# Patient Record
Sex: FEMALE | Race: BLACK OR AFRICAN AMERICAN | NOT HISPANIC OR LATINO | URBAN - METROPOLITAN AREA
[De-identification: names, ages, dates, MRNs, and addresses within clinical notes are randomized per-mention and may not be internally consistent; named-entity substitution may affect disease eponyms.]

---

## 2020-07-25 ENCOUNTER — TELEPHONE ENCOUNTER (OUTPATIENT)
Dept: URBAN - METROPOLITAN AREA CLINIC 13 | Facility: CLINIC | Age: 70
End: 2020-07-25

## 2020-07-25 RX ORDER — POLYETHYLENE GLYCOL 3350, SODIUM CHLORIDE, SODIUM BICARBONATE AND POTASSIUM CHLORIDE WITH LEMON FLAVOR 420; 11.2; 5.72; 1.48 G/4L; G/4L; G/4L; G/4L
TAKE 1/2 GALLON AT 5:00 PM DAY BEFORE PROCEDURE, TAKE SECOND 1/2 OF GALLON 6 HRS PRIOR TO PROCEDURE POWDER, FOR SOLUTION ORAL
Qty: 1 | Refills: 0 | OUTPATIENT
Start: 2019-07-03 | End: 2019-09-25

## 2020-07-25 RX ORDER — POLYETHYLENE GLYCOL 3350, SODIUM CHLORIDE, SODIUM BICARBONATE AND POTASSIUM CHLORIDE WITH LEMON FLAVOR 420; 11.2; 5.72; 1.48 G/4L; G/4L; G/4L; G/4L
USE AS DIRECTED POWDER, FOR SOLUTION ORAL
Qty: 1 | Refills: 0 | OUTPATIENT
Start: 2014-08-12 | End: 2014-09-23

## 2020-07-25 RX ORDER — EZETIMIBE 10 MG/1
TAKE 1 TABLET DAILY PRN TABLET ORAL
Refills: 0 | OUTPATIENT
Start: 2014-08-12 | End: 2019-09-25

## 2020-07-25 RX ORDER — IPRATROPIUM BROMIDE AND ALBUTEROL 20; 100 UG/1; UG/1
INHALE 1 PUFF 4 TIMES DAILY (MAXIMUM OF 6 PUFFS IN 24 HOURS) SPRAY, METERED RESPIRATORY (INHALATION)
Refills: 0 | OUTPATIENT
Start: 2014-08-12 | End: 2014-09-23

## 2020-07-25 RX ORDER — FUROSEMIDE 40 MG/1
TAKE 1/2 TABLET DAILY TABLET ORAL
Refills: 0 | OUTPATIENT
Start: 2014-08-12 | End: 2014-09-23

## 2020-07-26 ENCOUNTER — TELEPHONE ENCOUNTER (OUTPATIENT)
Dept: URBAN - METROPOLITAN AREA CLINIC 13 | Facility: CLINIC | Age: 70
End: 2020-07-26

## 2020-07-26 RX ORDER — POTASSIUM CHLORIDE 750 MG/1
TABLET, EXTENDED RELEASE ORAL
Qty: 180 | Refills: 0 | Status: ACTIVE | COMMUNITY
Start: 2018-10-29

## 2020-07-26 RX ORDER — SODIUM CHLORIDE 50 MG/ML
SOLUTION/ DROPS OPHTHALMIC
Qty: 45 | Refills: 0 | Status: ACTIVE | COMMUNITY
Start: 2019-06-03

## 2020-07-26 RX ORDER — LOSARTAN POTASSIUM AND HYDROCHLOROTHIAZIDE 50; 12.5 MG/1; MG/1
TABLET, FILM COATED ORAL
Qty: 90 | Refills: 0 | Status: ACTIVE | COMMUNITY
Start: 2018-10-04

## 2020-07-26 RX ORDER — DORZOLAMIDE HCL/PF 2 %
INSTILL 1 DROP INTO BOTH EYES DAILY DROPS OPHTHALMIC (EYE)
Refills: 0 | Status: ACTIVE | COMMUNITY

## 2020-07-26 RX ORDER — INSULIN GLARGINE 100 [IU]/ML
INJECTION, SOLUTION SUBCUTANEOUS
Qty: 15 | Refills: 0 | Status: ACTIVE | COMMUNITY
Start: 2018-11-26

## 2020-07-26 RX ORDER — LATANOPROST 0.005 %
INSTILL 1 DROP IN BOTH EYES AT BEDTIME DROPS OPHTHALMIC (EYE)
Refills: 0 | Status: ACTIVE | COMMUNITY
Start: 2014-08-12

## 2020-07-26 RX ORDER — DORZOLAMIDE HCL/PF 2 %
DROPS OPHTHALMIC (EYE)
Qty: 20 | Refills: 0 | Status: ACTIVE | COMMUNITY
Start: 2018-10-30

## 2020-07-26 RX ORDER — PANTOPRAZOLE SODIUM 40 MG/1
TABLET, DELAYED RELEASE ORAL
Qty: 90 | Refills: 0 | Status: ACTIVE | COMMUNITY
Start: 2018-11-15

## 2020-07-26 RX ORDER — SITAGLIPTIN AND METFORMIN HYDROCHLORIDE 100; 1000 MG/1; MG/1
TAKE 1 TABLET DAILY TABLET, FILM COATED, EXTENDED RELEASE ORAL
Refills: 0 | Status: ACTIVE | COMMUNITY
Start: 2014-08-12

## 2020-07-26 RX ORDER — PSYLLIUM HUSK 0.4 G
TAKE 2 TABLET DAILY CAPSULE ORAL
Refills: 0 | Status: ACTIVE | COMMUNITY

## 2020-07-26 RX ORDER — FLUTICASONE PROPIONATE AND SALMETEROL 50; 100 UG/1; UG/1
INHALE 1 PUFF TWICE DAILY POWDER RESPIRATORY (INHALATION)
Refills: 0 | Status: ACTIVE | COMMUNITY
Start: 2014-08-12

## 2020-07-26 RX ORDER — METOPROLOL TARTRATE 25 MG/1
TAKE 1 TABLET DAILY TABLET, FILM COATED ORAL
Refills: 0 | Status: ACTIVE | COMMUNITY
Start: 2014-08-12

## 2020-07-26 RX ORDER — KRILL/OM-3/DHA/EPA/PHOSPHO/AST 1000-230MG
TAKE 1 CAPSULE DAILY CAPSULE ORAL
Refills: 0 | Status: ACTIVE | COMMUNITY

## 2020-07-26 RX ORDER — PANTOPRAZOLE SODIUM 20 MG
TAKE 1 TABLET DAILY TABLET, DELAYED RELEASE (ENTERIC COATED) ORAL
Qty: 5 | Refills: 2 | Status: ACTIVE | COMMUNITY
Start: 2019-07-03

## 2020-07-26 RX ORDER — INSULIN GLARGINE 100 [IU]/ML
INJECT 24 UNIT DAILY INJECTION, SOLUTION SUBCUTANEOUS
Refills: 0 | Status: ACTIVE | COMMUNITY
Start: 2014-08-12

## 2020-07-26 RX ORDER — DICLOFENAC SODIUM 10 MG/G
GEL TOPICAL
Qty: 400 | Refills: 0 | Status: ACTIVE | COMMUNITY
Start: 2019-08-19

## 2020-07-26 RX ORDER — BRIMONIDINE TARTRATE 2 MG/ML
INSTILL 1 DROP IN BOTH EYES EVERY 12 HOURS DAILY SOLUTION/ DROPS OPHTHALMIC
Refills: 0 | Status: ACTIVE | COMMUNITY
Start: 2014-08-12

## 2020-07-26 RX ORDER — BLOOD SUGAR DIAGNOSTIC
STRIP MISCELLANEOUS
Qty: 300 | Refills: 0 | Status: ACTIVE | COMMUNITY
Start: 2018-10-16

## 2020-07-26 RX ORDER — BLOOD-GLUCOSE METER
EACH MISCELLANEOUS
Qty: 1 | Refills: 0 | Status: ACTIVE | COMMUNITY
Start: 2018-10-16

## 2020-07-26 RX ORDER — BLOOD GLUCOSE CONTROL HIGH,LOW
EACH MISCELLANEOUS
Qty: 1 | Refills: 0 | Status: ACTIVE | COMMUNITY
Start: 2018-11-09

## 2020-07-26 RX ORDER — POTASSIUM CHLORIDE 750 MG/1
TAKE 2 TABLET DAILY TABLET, EXTENDED RELEASE ORAL
Refills: 0 | Status: ACTIVE | COMMUNITY
Start: 2014-08-12

## 2020-07-26 RX ORDER — EZETIMIBE AND SIMVASTATIN 10; 40 MG/1; MG/1
TAKE 1 TABLET DAILY PRN TABLET ORAL
Refills: 0 | Status: ACTIVE | COMMUNITY
Start: 2014-08-12

## 2022-03-03 ENCOUNTER — WEB ENCOUNTER (OUTPATIENT)
Dept: URBAN - METROPOLITAN AREA CLINIC 113 | Facility: CLINIC | Age: 72
End: 2022-03-03

## 2022-03-03 ENCOUNTER — OFFICE VISIT (OUTPATIENT)
Dept: URBAN - METROPOLITAN AREA CLINIC 113 | Facility: CLINIC | Age: 72
End: 2022-03-03
Payer: MEDICARE

## 2022-03-03 VITALS
BODY MASS INDEX: 31.36 KG/M2 | SYSTOLIC BLOOD PRESSURE: 147 MMHG | HEIGHT: 63 IN | DIASTOLIC BLOOD PRESSURE: 82 MMHG | TEMPERATURE: 97.7 F | WEIGHT: 177 LBS | HEART RATE: 67 BPM | RESPIRATION RATE: 20 BRPM

## 2022-03-03 DIAGNOSIS — K21.9 GASTROESOPHAGEAL REFLUX DISEASE, UNSPECIFIED WHETHER ESOPHAGITIS PRESENT: ICD-10-CM

## 2022-03-03 DIAGNOSIS — R74.8 ELEVATED LIVER ENZYMES: ICD-10-CM

## 2022-03-03 PROCEDURE — 99204 OFFICE O/P NEW MOD 45 MIN: CPT | Performed by: NURSE PRACTITIONER

## 2022-03-03 RX ORDER — FLUTICASONE PROPIONATE AND SALMETEROL 50; 100 UG/1; UG/1
INHALE 1 PUFF TWICE DAILY POWDER RESPIRATORY (INHALATION)
Refills: 0 | Status: ACTIVE | COMMUNITY
Start: 2014-08-12

## 2022-03-03 RX ORDER — KRILL/OM-3/DHA/EPA/PHOSPHO/AST 1000-230MG
TAKE 1 CAPSULE DAILY CAPSULE ORAL
Refills: 0 | Status: ON HOLD | COMMUNITY

## 2022-03-03 RX ORDER — LOSARTAN POTASSIUM AND HYDROCHLOROTHIAZIDE 50; 12.5 MG/1; MG/1
1 TABLET TABLET, FILM COATED ORAL ONCE A DAY
Refills: 0 | Status: ACTIVE | COMMUNITY
Start: 2018-10-04

## 2022-03-03 RX ORDER — SODIUM CHLORIDE 50 MG/ML
SOLUTION/ DROPS OPHTHALMIC
Qty: 45 | Refills: 0 | Status: ACTIVE | COMMUNITY
Start: 2019-06-03

## 2022-03-03 RX ORDER — DICLOFENAC SODIUM 10 MG/G
GEL TOPICAL
Qty: 400 | Refills: 0 | Status: ON HOLD | COMMUNITY
Start: 2019-08-19

## 2022-03-03 RX ORDER — ASPIRIN 81 MG/1
1 TABLET TABLET, COATED ORAL ONCE A DAY
Status: ACTIVE | COMMUNITY

## 2022-03-03 RX ORDER — DORZOLAMIDE HCL/PF 2 %
INSTILL 1 DROP INTO BOTH EYES DAILY DROPS OPHTHALMIC (EYE)
Refills: 0 | Status: ACTIVE | COMMUNITY

## 2022-03-03 RX ORDER — PANTOPRAZOLE SODIUM 40 MG/1
TABLET, DELAYED RELEASE ORAL
Qty: 90 | Refills: 0 | Status: ACTIVE | COMMUNITY
Start: 2018-11-15

## 2022-03-03 RX ORDER — POTASSIUM CHLORIDE 750 MG/1
TABLET, EXTENDED RELEASE ORAL
Qty: 180 | Refills: 0 | Status: ON HOLD | COMMUNITY
Start: 2018-10-29

## 2022-03-03 RX ORDER — EZETIMIBE AND SIMVASTATIN 10; 40 MG/1; MG/1
TAKE 1 TABLET DAILY PRN TABLET ORAL
Refills: 0 | Status: ON HOLD | COMMUNITY
Start: 2014-08-12

## 2022-03-03 RX ORDER — METOPROLOL TARTRATE 25 MG/1
TAKE 1 TABLET DAILY TABLET, FILM COATED ORAL
Refills: 0 | Status: ACTIVE | COMMUNITY
Start: 2014-08-12

## 2022-03-03 RX ORDER — PANTOPRAZOLE SODIUM 20 MG
TAKE 1 TABLET DAILY TABLET, DELAYED RELEASE (ENTERIC COATED) ORAL
Qty: 5 | Refills: 2 | Status: ON HOLD | COMMUNITY
Start: 2019-07-03

## 2022-03-03 RX ORDER — COD LIVER OIL
AS DIRECTED CAPSULE ORAL
Status: ACTIVE | COMMUNITY

## 2022-03-03 RX ORDER — METHYLCELLULOSE 500 MG/1
2 TABLETS WITH A FULL GLASS OF WATER AS NEEDED TABLET ORAL
Status: ACTIVE | COMMUNITY

## 2022-03-03 RX ORDER — LATANOPROST 0.005 %
INSTILL 1 DROP IN BOTH EYES AT BEDTIME DROPS OPHTHALMIC (EYE)
Refills: 0 | Status: ACTIVE | COMMUNITY
Start: 2014-08-12

## 2022-03-03 RX ORDER — INSULIN GLARGINE 100 [IU]/ML
AS DIRECTED INJECTION, SOLUTION SUBCUTANEOUS AT BEDTIME
Refills: 0 | Status: ON HOLD | COMMUNITY
Start: 2014-08-12

## 2022-03-03 RX ORDER — INSULIN GLARGINE 100 [IU]/ML
INJECT 12 UNITS INJECTION, SOLUTION SUBCUTANEOUS ONCE DAILY
Refills: 0 | Status: ACTIVE | COMMUNITY
Start: 2018-11-26

## 2022-03-03 RX ORDER — GLUCOSA SU 2KCL/CHONDROITIN SU 500-400 MG
AS DIRECTED CAPSULE ORAL
Status: ACTIVE | COMMUNITY

## 2022-03-03 RX ORDER — SITAGLIPTIN AND METFORMIN HYDROCHLORIDE 100; 1000 MG/1; MG/1
TAKE 1 TABLET DAILY TABLET, FILM COATED, EXTENDED RELEASE ORAL
Refills: 0 | Status: ACTIVE | COMMUNITY
Start: 2014-08-12

## 2022-03-03 RX ORDER — BRIMONIDINE TARTRATE 2 MG/ML
INSTILL 1 DROP IN BOTH EYES EVERY 12 HOURS DAILY SOLUTION/ DROPS OPHTHALMIC
Refills: 0 | Status: ACTIVE | COMMUNITY
Start: 2014-08-12

## 2022-03-03 RX ORDER — PSYLLIUM HUSK 0.4 G
TAKE 2 TABLET DAILY CAPSULE ORAL
Refills: 0 | Status: ACTIVE | COMMUNITY

## 2022-03-03 RX ORDER — ANTIOX #8/OM3/DHA/EPA/LUT/ZEAX 250-2.5 MG
AS DIRECTED CAPSULE ORAL
Status: ACTIVE | COMMUNITY

## 2022-03-03 RX ORDER — POTASSIUM CHLORIDE 750 MG/1
1 TABLET WITH FOOD TABLET, EXTENDED RELEASE ORAL TWICE A DAY
Refills: 0 | Status: ACTIVE | COMMUNITY
Start: 2014-08-12

## 2022-03-03 NOTE — HPI-OTHER HISTORIES
Labs (reviewed on a text message): 2/23/2022 BMP normal.  LFTs: TB 7.0, , , . Handwritten notes on lab document revealed: 1/24/2022 ALP 77, ,  10/25/2021: ALT 19, AST 26 Colonoscopy was performed on 9/25/19. The bowel preparation was good. The procedure was notable for a normal terminal ileum and colon.

## 2022-03-03 NOTE — HPI-TODAY'S VISIT:
This is a 71-year-old female with a history of hypertension, diabetes, hyperlipidemia, sleep apnea, GERD, constipation, and adenomatous colon polyps due surveillance in  presenting for evaluation of fatty liver and elevated LFTs.  She was last seen in  following a colonoscopy negative for polyps.  Due to a prior history of adenomas, surveillance was recommended 2024.  Constipation was controlled with high-fiber intake.  She was instructed to begin daily fiber.  GERD was controlled with pantoprazole 40 mg daily.  She denies a history of liver enzyme elevation.  Her brother  from pancreatic cancer.  She denies a family history of liver disease.  She started a multivitamin in 2021.  She started Lyrica on 2022.  She denies other changes in her medications and denies antibiotic use.  She drinks alcohol once a year.  She admits feeling unwell on .  She reports generalized weakness with blurred vision and upper respiratory symptoms.  Her blood glucose levels were elevated while she was ill.  She had symptoms when her blood was drawn on . She reports a negative Covid test. She stopped taking Lyrica 2 weeks ago because her urine was dark and she was concerned this was a side effect.  Urinalysis and urine culture were performed yesterday by the primary care physician.  She has a history of acid reflux controlled with pantoprazole 40 mg daily.  She denies abdominal pain, nausea, or dysphagia.  She has a bowel movement once a day.  In January, she experienced some constipation that may have been associated with diet.  She took Citrucel for 2 or 3 days and this has resolved.  She is having a normal bowel movement once or twice a day without red blood per rectum or melena.  She reports involuntary weight loss.  She had a CT scan of the abdomen at [facility] on 2022.  The report is unavailable.  She reports there was some calcium deposits and gallbladder sludge.

## 2022-03-05 LAB
ACTIN (SMOOTH MUSCLE) ANTIBODY: 9
ALBUMIN: 4
ALKALINE PHOSPHATASE: 133
ALT (SGPT): 91
ANA DIRECT: NEGATIVE
AST (SGOT): 42
BASO (ABSOLUTE): 0.1
BASOS: 1
BILIRUBIN, DIRECT: 0.48
BILIRUBIN, TOTAL: 0.9
EOS (ABSOLUTE): 0.2
EOS: 3
FERRITIN, SERUM: 108
HBSAG SCREEN: NEGATIVE
HEMATOCRIT: 42.1
HEMATOLOGY COMMENTS:: (no result)
HEMOGLOBIN: 13.2
HEP A AB, TOTAL: NEGATIVE
HEP B CORE AB, TOT: NEGATIVE
HEP C VIRUS AB: <0.1
HEPATITIS B SURF AB QUANT: <3.1
IMMATURE CELLS: (no result)
IMMATURE GRANS (ABS): 0
IMMATURE GRANULOCYTES: 0
INR: 1
IRON BIND.CAP.(TIBC): 295
IRON SATURATION: 31
IRON: 91
LYMPHS (ABSOLUTE): 2.3
LYMPHS: 32
MCH: 25.9
MCHC: 31.4
MCV: 83
MITOCHONDRIAL (M2) ANTIBODY: <20
MONOCYTES(ABSOLUTE): 0.5
MONOCYTES: 7
NEUTROPHILS (ABSOLUTE): 4
NEUTROPHILS: 57
NRBC: (no result)
PLATELETS: 275
PROTEIN, TOTAL: 6.9
PROTHROMBIN TIME: 10.7
RBC: 5.1
RDW: 14.7
UIBC: 204
WBC: 7.1

## 2022-03-06 ENCOUNTER — TELEPHONE ENCOUNTER (OUTPATIENT)
Dept: URBAN - METROPOLITAN AREA CLINIC 113 | Facility: CLINIC | Age: 72
End: 2022-03-06

## 2022-04-20 ENCOUNTER — WEB ENCOUNTER (OUTPATIENT)
Dept: URBAN - METROPOLITAN AREA CLINIC 113 | Facility: CLINIC | Age: 72
End: 2022-04-20

## 2022-04-22 ENCOUNTER — OFFICE VISIT (OUTPATIENT)
Dept: URBAN - METROPOLITAN AREA CLINIC 113 | Facility: CLINIC | Age: 72
End: 2022-04-22
Payer: MEDICARE

## 2022-04-22 ENCOUNTER — DASHBOARD ENCOUNTERS (OUTPATIENT)
Age: 72
End: 2022-04-22

## 2022-04-22 VITALS
HEIGHT: 63 IN | BODY MASS INDEX: 31.54 KG/M2 | RESPIRATION RATE: 20 BRPM | WEIGHT: 178 LBS | DIASTOLIC BLOOD PRESSURE: 82 MMHG | SYSTOLIC BLOOD PRESSURE: 135 MMHG | TEMPERATURE: 97.1 F | HEART RATE: 71 BPM

## 2022-04-22 DIAGNOSIS — Z86.010 HISTORY OF ADENOMATOUS POLYP OF COLON: ICD-10-CM

## 2022-04-22 DIAGNOSIS — K21.9 GASTROESOPHAGEAL REFLUX DISEASE, UNSPECIFIED WHETHER ESOPHAGITIS PRESENT: ICD-10-CM

## 2022-04-22 DIAGNOSIS — R74.8 ELEVATED LIVER ENZYMES: ICD-10-CM

## 2022-04-22 PROCEDURE — 99213 OFFICE O/P EST LOW 20 MIN: CPT | Performed by: NURSE PRACTITIONER

## 2022-04-22 RX ORDER — SITAGLIPTIN AND METFORMIN HYDROCHLORIDE 100; 1000 MG/1; MG/1
TAKE 1 TABLET DAILY TABLET, FILM COATED, EXTENDED RELEASE ORAL
Refills: 0 | Status: ACTIVE | COMMUNITY
Start: 2014-08-12

## 2022-04-22 RX ORDER — BRIMONIDINE TARTRATE 2 MG/ML
INSTILL 1 DROP IN BOTH EYES EVERY 12 HOURS DAILY SOLUTION/ DROPS OPHTHALMIC
Refills: 0 | Status: ACTIVE | COMMUNITY
Start: 2014-08-12

## 2022-04-22 RX ORDER — FLUTICASONE PROPIONATE AND SALMETEROL 50; 100 UG/1; UG/1
INHALE 1 PUFF TWICE DAILY POWDER RESPIRATORY (INHALATION)
Refills: 0 | Status: ACTIVE | COMMUNITY
Start: 2014-08-12

## 2022-04-22 RX ORDER — PSYLLIUM HUSK 0.4 G
TAKE 2 TABLET DAILY CAPSULE ORAL
Refills: 0 | Status: ACTIVE | COMMUNITY

## 2022-04-22 RX ORDER — INSULIN GLARGINE 100 [IU]/ML
INJECT 12 UNITS INJECTION, SOLUTION SUBCUTANEOUS ONCE DAILY
Refills: 0 | Status: ACTIVE | COMMUNITY
Start: 2018-11-26

## 2022-04-22 RX ORDER — METOPROLOL TARTRATE 25 MG/1
TAKE 1 TABLET DAILY TABLET, FILM COATED ORAL
Refills: 0 | Status: ACTIVE | COMMUNITY
Start: 2014-08-12

## 2022-04-22 RX ORDER — PANTOPRAZOLE SODIUM 20 MG
TAKE 1 TABLET DAILY TABLET, DELAYED RELEASE (ENTERIC COATED) ORAL
Qty: 5 | Refills: 2 | Status: ON HOLD | COMMUNITY
Start: 2019-07-03

## 2022-04-22 RX ORDER — ASPIRIN 81 MG/1
1 TABLET TABLET, COATED ORAL ONCE A DAY
Status: ACTIVE | COMMUNITY

## 2022-04-22 RX ORDER — COD LIVER OIL
AS DIRECTED CAPSULE ORAL
Status: ACTIVE | COMMUNITY

## 2022-04-22 RX ORDER — INSULIN GLARGINE 100 [IU]/ML
AS DIRECTED INJECTION, SOLUTION SUBCUTANEOUS AT BEDTIME
Refills: 0 | Status: ON HOLD | COMMUNITY
Start: 2014-08-12

## 2022-04-22 RX ORDER — PANTOPRAZOLE SODIUM 40 MG/1
1 TABLET TABLET, DELAYED RELEASE ORAL ONCE A DAY
Refills: 0 | Status: ACTIVE | COMMUNITY
Start: 2018-11-15

## 2022-04-22 RX ORDER — LOSARTAN POTASSIUM AND HYDROCHLOROTHIAZIDE 50; 12.5 MG/1; MG/1
1 TABLET TABLET, FILM COATED ORAL ONCE A DAY
Refills: 0 | Status: ON HOLD | COMMUNITY
Start: 2018-10-04

## 2022-04-22 RX ORDER — LATANOPROST 0.005 %
INSTILL 1 DROP IN BOTH EYES AT BEDTIME DROPS OPHTHALMIC (EYE)
Refills: 0 | Status: ACTIVE | COMMUNITY
Start: 2014-08-12

## 2022-04-22 RX ORDER — METHYLCELLULOSE 500 MG/1
2 TABLETS WITH A FULL GLASS OF WATER AS NEEDED TABLET ORAL
Status: ACTIVE | COMMUNITY

## 2022-04-22 RX ORDER — EZETIMIBE AND SIMVASTATIN 10; 40 MG/1; MG/1
TAKE 1 TABLET DAILY PRN TABLET ORAL
Refills: 0 | Status: ON HOLD | COMMUNITY
Start: 2014-08-12

## 2022-04-22 RX ORDER — GLUCOSA SU 2KCL/CHONDROITIN SU 500-400 MG
AS DIRECTED CAPSULE ORAL
Status: ON HOLD | COMMUNITY

## 2022-04-22 RX ORDER — POTASSIUM CHLORIDE 750 MG/1
TABLET, EXTENDED RELEASE ORAL
Qty: 180 | Refills: 0 | Status: ON HOLD | COMMUNITY
Start: 2018-10-29

## 2022-04-22 RX ORDER — DICLOFENAC SODIUM 10 MG/G
GEL TOPICAL
Qty: 400 | Refills: 0 | Status: ON HOLD | COMMUNITY
Start: 2019-08-19

## 2022-04-22 RX ORDER — VALSARTAN AND HYDROCHLOROTHIAZIDE 80; 12.5 MG/1; MG/1
1 TABLET TABLET, FILM COATED ORAL ONCE A DAY
Status: ACTIVE | COMMUNITY

## 2022-04-22 RX ORDER — POTASSIUM CHLORIDE 750 MG/1
1 TABLET WITH FOOD TABLET, EXTENDED RELEASE ORAL TWICE A DAY
Refills: 0 | Status: ACTIVE | COMMUNITY
Start: 2014-08-12

## 2022-04-22 RX ORDER — KRILL/OM-3/DHA/EPA/PHOSPHO/AST 1000-230MG
TAKE 1 CAPSULE DAILY CAPSULE ORAL
Refills: 0 | Status: ON HOLD | COMMUNITY

## 2022-04-22 RX ORDER — DORZOLAMIDE HCL/PF 2 %
INSTILL 1 DROP INTO BOTH EYES DAILY DROPS OPHTHALMIC (EYE)
Refills: 0 | Status: ACTIVE | COMMUNITY

## 2022-04-22 RX ORDER — SODIUM CHLORIDE 50 MG/ML
SOLUTION/ DROPS OPHTHALMIC
Qty: 45 | Refills: 0 | Status: ACTIVE | COMMUNITY
Start: 2019-06-03

## 2022-04-22 RX ORDER — ANTIOX #8/OM3/DHA/EPA/LUT/ZEAX 250-2.5 MG
AS DIRECTED CAPSULE ORAL
Status: ACTIVE | COMMUNITY

## 2022-04-22 NOTE — HPI-OTHER HISTORIES
Labs 4/11/2022:IgG 1253, IgA 250.5, IgM 46.  LFTs: TB 0.8, ALP 80, ALT 15, AST 18. Labs 3/3/2022: Iron studies, ASMA, AMA, RBUI, PT/INR normal.  Hepatitis B core antibody total, hepatitis A antibody total, hepatitis B surface antigen, hepatitis B surface antibody, hepatitis C antibody negative or nonreactive.  LFTs: TB 0.48, , ALT 91, AST 42.  CBC: WBC 7.1, hemoglobin 13.2, MCV 83, platelet 275. CT of the abdomen and pelvis with and without contrast 2/25/2022:No acute abnormality within the abdomen or pelvis.  Peripherally calcified hypodense lesion within hepatic segment 6, likely hemangioma.  No suspicious hepatic lesion.  Cholelithiasis without evidence of acute cholecystitis. Labs 2/23/2022:Creatinine kinase 143.  BMP normal.  LFTs: TB 7.0, , , .  CBC: WBC 8.94, hemoglobin 12.7, MCV 81.4, platelet 183.  Lipid panel normal.  Labs 1/24/2022 include LFTs: TB 1.4, , , . Labs (reviewed on a text message): 2/23/2022 BMP normal.  LFTs: TB 7.0, , , . Handwritten notes on lab document revealed: 1/24/2022 ALP 77, ,  10/25/2021: ALT 19, AST 26 Colonoscopy was performed on 9/25/19. The bowel preparation was good. The procedure was notable for a normal terminal ileum and colon.

## 2022-04-22 NOTE — HPI-TODAY'S VISIT:
This is a 71-year-old female with a history of hypertension, diabetes, hyperlipidemia, sleep apnea, GERD, constipation, adenomatous colon polyps due surveillance in 2024, fatty liver, and elevated liver enzymes presenting for follow-up. She was last seen 3/3/2022.  Labs demonstrated liver enzyme elevation.  She had started Lyrica on 2/1; however, liver enzyme elevation predated initiation of that medication.  She had also experienced a viral illness in late February.  A reactive process was also within the differential.  However, liver enzyme elevation also predated that illness.  She reported a CT scan demonstrating gallbladder sludge.  She denied abdominal pain.  CT results were requested.  Additional labs were performed to assess for viral, autoimmune, and hereditary sources of liver disease.  LFTs were rechecked to assess a trend.  GERD was controlled with pantoprazole 40 mg daily.  Additional labs were ordered by Dr. Mehta to be drawn in April; results below. She is doing well.  She denies any abdominal symptoms.  She has a history of constipation which is controlled with fiber.  GERD is controlled with daily PPI.

## 2022-04-23 PROBLEM — 707724006: Status: ACTIVE | Noted: 2022-04-23

## 2022-04-23 PROBLEM — 235595009: Status: ACTIVE | Noted: 2022-03-03

## 2022-04-23 PROBLEM — 429047008: Status: ACTIVE | Noted: 2022-04-23

## 2024-12-04 ENCOUNTER — OFFICE VISIT (OUTPATIENT)
Dept: URBAN - METROPOLITAN AREA CLINIC 113 | Facility: CLINIC | Age: 74
End: 2024-12-04

## 2025-01-09 ENCOUNTER — LAB OUTSIDE AN ENCOUNTER (OUTPATIENT)
Dept: URBAN - METROPOLITAN AREA CLINIC 113 | Facility: CLINIC | Age: 75
End: 2025-01-09

## 2025-01-09 ENCOUNTER — OFFICE VISIT (OUTPATIENT)
Dept: URBAN - METROPOLITAN AREA CLINIC 113 | Facility: CLINIC | Age: 75
End: 2025-01-09
Payer: MEDICARE

## 2025-01-09 VITALS
DIASTOLIC BLOOD PRESSURE: 82 MMHG | HEART RATE: 61 BPM | BODY MASS INDEX: 31.15 KG/M2 | RESPIRATION RATE: 20 BRPM | SYSTOLIC BLOOD PRESSURE: 149 MMHG | WEIGHT: 175.8 LBS | HEIGHT: 63 IN | TEMPERATURE: 97.5 F

## 2025-01-09 DIAGNOSIS — Z86.0101 HISTORY OF ADENOMATOUS POLYP OF COLON: ICD-10-CM

## 2025-01-09 DIAGNOSIS — K21.9 GERD WITHOUT ESOPHAGITIS: ICD-10-CM

## 2025-01-09 PROBLEM — 429047008: Status: ACTIVE | Noted: 2025-01-09

## 2025-01-09 PROBLEM — 266435005: Status: ACTIVE | Noted: 2025-01-09

## 2025-01-09 PROCEDURE — 99213 OFFICE O/P EST LOW 20 MIN: CPT | Performed by: NURSE PRACTITIONER

## 2025-01-09 RX ORDER — SODIUM CHLORIDE 50 MG/ML
SOLUTION/ DROPS OPHTHALMIC
Qty: 45 | Refills: 0 | Status: ACTIVE | COMMUNITY
Start: 2019-06-03

## 2025-01-09 RX ORDER — MONTELUKAST 10 MG/1
1 TABLET TABLET, FILM COATED ORAL ONCE A DAY
Status: ACTIVE | COMMUNITY

## 2025-01-09 RX ORDER — LOSARTAN POTASSIUM AND HYDROCHLOROTHIAZIDE 50; 12.5 MG/1; MG/1
1 TABLET TABLET, FILM COATED ORAL ONCE A DAY
Refills: 0 | Status: ON HOLD | COMMUNITY
Start: 2018-10-04

## 2025-01-09 RX ORDER — EZETIMIBE AND SIMVASTATIN 10; 40 MG/1; MG/1
TAKE 1 TABLET DAILY PRN TABLET ORAL
Refills: 0 | Status: ON HOLD | COMMUNITY
Start: 2014-08-12

## 2025-01-09 RX ORDER — PANTOPRAZOLE SODIUM 40 MG/1
1 TABLET TABLET, DELAYED RELEASE ORAL ONCE A DAY
Refills: 0 | Status: ACTIVE | COMMUNITY
Start: 2018-11-15

## 2025-01-09 RX ORDER — ASPIRIN 81 MG/1
1 TABLET TABLET, COATED ORAL ONCE A DAY
Status: ACTIVE | COMMUNITY

## 2025-01-09 RX ORDER — INSULIN GLARGINE 100 [IU]/ML
AS DIRECTED INJECTION, SOLUTION SUBCUTANEOUS AT BEDTIME
Refills: 0 | Status: ON HOLD | COMMUNITY
Start: 2014-08-12

## 2025-01-09 RX ORDER — SITAGLIPTIN AND METFORMIN HYDROCHLORIDE 100; 1000 MG/1; MG/1
TAKE 1 TABLET DAILY TABLET, FILM COATED, EXTENDED RELEASE ORAL
Refills: 0 | Status: ACTIVE | COMMUNITY
Start: 2014-08-12

## 2025-01-09 RX ORDER — DICLOFENAC SODIUM TOPICAL GEL, 1% 10 MG/G
GEL TOPICAL
Qty: 400 | Refills: 0 | Status: ON HOLD | COMMUNITY
Start: 2019-08-19

## 2025-01-09 RX ORDER — METHYLCELLULOSE 500 MG/1
2 TABLETS WITH A FULL GLASS OF WATER AS NEEDED TABLET ORAL
Status: ON HOLD | COMMUNITY

## 2025-01-09 RX ORDER — INSULIN GLARGINE 100 [IU]/ML
INJECT 12 UNITS INJECTION, SOLUTION SUBCUTANEOUS ONCE DAILY
Refills: 0 | Status: ACTIVE | COMMUNITY
Start: 2018-11-26

## 2025-01-09 RX ORDER — POTASSIUM CHLORIDE 750 MG/1
1 TABLET WITH FOOD TABLET, EXTENDED RELEASE ORAL TWICE A DAY
Refills: 0 | Status: ACTIVE | COMMUNITY
Start: 2014-08-12

## 2025-01-09 RX ORDER — ANTIOX #8/OM3/DHA/EPA/LUT/ZEAX 250-2.5 MG
AS DIRECTED CAPSULE ORAL
Status: ACTIVE | COMMUNITY

## 2025-01-09 RX ORDER — KRILL/OM-3/DHA/EPA/PHOSPHO/AST 1000-230MG
TAKE 1 CAPSULE DAILY CAPSULE ORAL
Refills: 0 | Status: ON HOLD | COMMUNITY

## 2025-01-09 RX ORDER — COD LIVER OIL
AS DIRECTED CAPSULE ORAL
Status: ON HOLD | COMMUNITY

## 2025-01-09 RX ORDER — PREGABALIN 75 MG/1
1 CAPSULE CAPSULE ORAL ONCE A DAY
Status: ACTIVE | COMMUNITY

## 2025-01-09 RX ORDER — PSYLLIUM HUSK 0.4 G
TAKE 2 TABLET DAILY CAPSULE ORAL
Refills: 0 | Status: ACTIVE | COMMUNITY

## 2025-01-09 RX ORDER — PANTOPRAZOLE SODIUM 20 MG
TAKE 1 TABLET DAILY TABLET, DELAYED RELEASE (ENTERIC COATED) ORAL
Qty: 5 | Refills: 2 | Status: ON HOLD | COMMUNITY
Start: 2019-07-03

## 2025-01-09 RX ORDER — GLUCOSA SU 2KCL/CHONDROITIN SU 500-400 MG
AS DIRECTED CAPSULE ORAL
Status: ON HOLD | COMMUNITY

## 2025-01-09 RX ORDER — METOPROLOL TARTRATE 25 MG/1
TAKE 1 TABLET DAILY TABLET, FILM COATED ORAL
Refills: 0 | Status: ACTIVE | COMMUNITY
Start: 2014-08-12

## 2025-01-09 RX ORDER — POLYETHYLENE GLYCOL 3350, SODIUM CHLORIDE, SODIUM BICARBONATE, POTASSIUM CHLORIDE 420; 11.2; 5.72; 1.48 G/4L; G/4L; G/4L; G/4L
AS DIRECTED POWDER, FOR SOLUTION ORAL
Qty: 1 BOTTLE | Refills: 0 | OUTPATIENT
Start: 2025-01-09 | End: 2025-01-10

## 2025-01-09 RX ORDER — ROSUVASTATIN CALCIUM 10 MG/1
1 TABLET TABLET, FILM COATED ORAL ONCE A DAY
Status: ACTIVE | COMMUNITY

## 2025-01-09 RX ORDER — BRIMONIDINE TARTRATE 2 MG/ML
INSTILL 1 DROP IN BOTH EYES EVERY 12 HOURS DAILY SOLUTION/ DROPS OPHTHALMIC
Refills: 0 | Status: ACTIVE | COMMUNITY
Start: 2014-08-12

## 2025-01-09 RX ORDER — POTASSIUM CHLORIDE 750 MG/1
TABLET, EXTENDED RELEASE ORAL
Qty: 180 | Refills: 0 | Status: ON HOLD | COMMUNITY
Start: 2018-10-29

## 2025-01-09 RX ORDER — DORZOLAMIDE HCL/PF 2 %
INSTILL 1 DROP INTO BOTH EYES DAILY DROPS OPHTHALMIC (EYE)
Refills: 0 | Status: ACTIVE | COMMUNITY

## 2025-01-09 RX ORDER — FLUTICASONE PROPIONATE AND SALMETEROL 50; 100 UG/1; UG/1
INHALE 1 PUFF TWICE DAILY POWDER RESPIRATORY (INHALATION)
Refills: 0 | Status: ACTIVE | COMMUNITY
Start: 2014-08-12

## 2025-01-09 RX ORDER — LATANOPROST 0.005 %
INSTILL 1 DROP IN BOTH EYES AT BEDTIME DROPS OPHTHALMIC (EYE)
Refills: 0 | Status: ACTIVE | COMMUNITY
Start: 2014-08-12

## 2025-01-09 RX ORDER — VALSARTAN AND HYDROCHLOROTHIAZIDE 80; 12.5 MG/1; MG/1
1 TABLET TABLET, FILM COATED ORAL ONCE A DAY
Status: ON HOLD | COMMUNITY

## 2025-01-09 NOTE — HPI-OTHER HISTORIES
Labs 9/30/2024:CBC: WBC 6.16, hemoglobin 13.5, MCV 82.6, platelet 195. BMP normal with exception of chloride 110, glucose 111. A1c 6.7. LFTs normal TB 0.9, ALP 63, ALT 22, AST 29. Creatinine kinase 298. Lipid panel normal.   Labs 4/11/2022:IgG 1253, IgA 250.5, IgM 46.  LFTs: TB 0.8, ALP 80, ALT 15, AST 18. Labs 3/3/2022: Iron studies, ASMA, AMA, RUBI, PT/INR normal.  Hepatitis B core antibody total, hepatitis A antibody total, hepatitis B surface antigen, hepatitis B surface antibody, hepatitis C antibody negative or nonreactive.  LFTs: TB 0.48, , ALT 91, AST 42.  CBC: WBC 7.1, hemoglobin 13.2, MCV 83, platelet 275.  CT of the abdomen and pelvis with and without contrast 2/25/2022:No acute abnormality within the abdomen or pelvis.  Peripherally calcified hypodense lesion within hepatic segment 6, likely hemangioma.  No suspicious hepatic lesion.  Cholelithiasis without evidence of acute cholecystitis.  Labs 2/23/2022:Creatinine kinase 143.  BMP normal.  LFTs: TB 7.0, , , .  CBC: WBC 8.94, hemoglobin 12.7, MCV 81.4, platelet 183.  Lipid panel normal.  Labs 1/24/2022 include LFTs: TB 1.4, , , . Labs (reviewed on a text message): 2/23/2022 BMP normal.  LFTs: TB 7.0, , , . Handwritten notes on lab document revealed: 1/24/2022 ALP 77, ,  10/25/2021: ALT 19, AST 26  Colonoscopy was performed on 9/25/19. The bowel preparation was good. The procedure was notable for a normal terminal ileum and colon.

## 2025-01-09 NOTE — HPI-TODAY'S VISIT:
This is a 74-year-old female with a history of hypertension, diabetes, hyperlipidemia, sleep apnea, GERD, constipation, adenomatous colon polyps due surveillance, fatty liver, and liver enzyme elevation presenting for long interval follow-up to schedule colonoscopy.  She was last seen 4/22/2022.  GERD was controlled with pantoprazole 40 mg daily.  She had significant liver enzyme elevation earlier in 2022.  Labs to assess for viral, autoimmune, and hereditary sources of liver disease were negative.  Recent labs demonstrated normal LFTs.  A reactive process was suspected.  She was due adenoma surveillance in September 2024.  She is taking pantoprazole 40 mg daily.  Acid reflux symptoms are controlled.  She is controlling chronic constipation with dietary modification.  She denies other abdominal symptoms.  Since her last visit, she has undergone a carpal tunnel repair.  A second repair on the opposite wrist is planned soon. She had a recent upper respiratory tract infection.  She reports an occasional cough producing clear mucus.  She reports her primary symptom is sinus congestion.  Her symptoms are improving.  She denies dyspnea.  She takes Advair for a history of chronic bronchitis which has been stable.

## 2025-01-24 ENCOUNTER — OFFICE VISIT (OUTPATIENT)
Dept: URBAN - METROPOLITAN AREA SURGERY CENTER 25 | Facility: SURGERY CENTER | Age: 75
End: 2025-01-24

## 2025-01-30 ENCOUNTER — OFFICE VISIT (OUTPATIENT)
Dept: URBAN - METROPOLITAN AREA SURGERY CENTER 25 | Facility: SURGERY CENTER | Age: 75
End: 2025-01-30

## 2025-01-30 RX ORDER — INSULIN GLARGINE 100 [IU]/ML
INJECT 12 UNITS INJECTION, SOLUTION SUBCUTANEOUS ONCE DAILY
Refills: 0 | Status: ACTIVE | COMMUNITY
Start: 2018-11-26

## 2025-01-30 RX ORDER — ROSUVASTATIN CALCIUM 10 MG/1
1 TABLET TABLET, FILM COATED ORAL ONCE A DAY
Status: ACTIVE | COMMUNITY

## 2025-01-30 RX ORDER — METHYLCELLULOSE 500 MG/1
2 TABLETS WITH A FULL GLASS OF WATER AS NEEDED TABLET ORAL
Status: ON HOLD | COMMUNITY

## 2025-01-30 RX ORDER — POTASSIUM CHLORIDE 750 MG/1
TABLET, EXTENDED RELEASE ORAL
Qty: 180 | Refills: 0 | Status: ON HOLD | COMMUNITY
Start: 2018-10-29

## 2025-01-30 RX ORDER — FLUTICASONE PROPIONATE AND SALMETEROL 50; 100 UG/1; UG/1
INHALE 1 PUFF TWICE DAILY POWDER RESPIRATORY (INHALATION)
Refills: 0 | Status: ACTIVE | COMMUNITY
Start: 2014-08-12

## 2025-01-30 RX ORDER — PREGABALIN 75 MG/1
1 CAPSULE CAPSULE ORAL ONCE A DAY
Status: ACTIVE | COMMUNITY

## 2025-01-30 RX ORDER — DICLOFENAC SODIUM TOPICAL GEL, 1% 10 MG/G
GEL TOPICAL
Qty: 400 | Refills: 0 | Status: ON HOLD | COMMUNITY
Start: 2019-08-19

## 2025-01-30 RX ORDER — PANTOPRAZOLE SODIUM 40 MG/1
1 TABLET TABLET, DELAYED RELEASE ORAL ONCE A DAY
Refills: 0 | Status: ACTIVE | COMMUNITY
Start: 2018-11-15

## 2025-01-30 RX ORDER — INSULIN GLARGINE 100 [IU]/ML
AS DIRECTED INJECTION, SOLUTION SUBCUTANEOUS AT BEDTIME
Refills: 0 | Status: ON HOLD | COMMUNITY
Start: 2014-08-12

## 2025-01-30 RX ORDER — METOPROLOL TARTRATE 25 MG/1
TAKE 1 TABLET DAILY TABLET, FILM COATED ORAL
Refills: 0 | Status: ACTIVE | COMMUNITY
Start: 2014-08-12

## 2025-01-30 RX ORDER — POTASSIUM CHLORIDE 750 MG/1
1 TABLET WITH FOOD TABLET, EXTENDED RELEASE ORAL TWICE A DAY
Refills: 0 | Status: ACTIVE | COMMUNITY
Start: 2014-08-12

## 2025-01-30 RX ORDER — MONTELUKAST 10 MG/1
1 TABLET TABLET, FILM COATED ORAL ONCE A DAY
Status: ACTIVE | COMMUNITY

## 2025-01-30 RX ORDER — KRILL/OM-3/DHA/EPA/PHOSPHO/AST 1000-230MG
TAKE 1 CAPSULE DAILY CAPSULE ORAL
Refills: 0 | Status: ON HOLD | COMMUNITY

## 2025-01-30 RX ORDER — ANTIOX #8/OM3/DHA/EPA/LUT/ZEAX 250-2.5 MG
AS DIRECTED CAPSULE ORAL
Status: ACTIVE | COMMUNITY

## 2025-01-30 RX ORDER — VALSARTAN AND HYDROCHLOROTHIAZIDE 80; 12.5 MG/1; MG/1
1 TABLET TABLET, FILM COATED ORAL ONCE A DAY
Status: ON HOLD | COMMUNITY

## 2025-01-30 RX ORDER — DORZOLAMIDE HCL/PF 2 %
INSTILL 1 DROP INTO BOTH EYES DAILY DROPS OPHTHALMIC (EYE)
Refills: 0 | Status: ACTIVE | COMMUNITY

## 2025-01-30 RX ORDER — BRIMONIDINE TARTRATE 2 MG/ML
INSTILL 1 DROP IN BOTH EYES EVERY 12 HOURS DAILY SOLUTION/ DROPS OPHTHALMIC
Refills: 0 | Status: ACTIVE | COMMUNITY
Start: 2014-08-12

## 2025-01-30 RX ORDER — GLUCOSA SU 2KCL/CHONDROITIN SU 500-400 MG
AS DIRECTED CAPSULE ORAL
Status: ON HOLD | COMMUNITY

## 2025-01-30 RX ORDER — ASPIRIN 81 MG/1
1 TABLET TABLET, COATED ORAL ONCE A DAY
Status: ACTIVE | COMMUNITY

## 2025-01-30 RX ORDER — COD LIVER OIL
AS DIRECTED CAPSULE ORAL
Status: ON HOLD | COMMUNITY

## 2025-01-30 RX ORDER — LOSARTAN POTASSIUM AND HYDROCHLOROTHIAZIDE 50; 12.5 MG/1; MG/1
1 TABLET TABLET, FILM COATED ORAL ONCE A DAY
Refills: 0 | Status: ON HOLD | COMMUNITY
Start: 2018-10-04

## 2025-01-30 RX ORDER — EZETIMIBE AND SIMVASTATIN 10; 40 MG/1; MG/1
TAKE 1 TABLET DAILY PRN TABLET ORAL
Refills: 0 | Status: ON HOLD | COMMUNITY
Start: 2014-08-12

## 2025-01-30 RX ORDER — PANTOPRAZOLE SODIUM 20 MG
TAKE 1 TABLET DAILY TABLET, DELAYED RELEASE (ENTERIC COATED) ORAL
Qty: 5 | Refills: 2 | Status: ON HOLD | COMMUNITY
Start: 2019-07-03

## 2025-01-30 RX ORDER — PSYLLIUM HUSK 0.4 G
TAKE 2 TABLET DAILY CAPSULE ORAL
Refills: 0 | Status: ACTIVE | COMMUNITY

## 2025-01-30 RX ORDER — SITAGLIPTIN AND METFORMIN HYDROCHLORIDE 100; 1000 MG/1; MG/1
TAKE 1 TABLET DAILY TABLET, FILM COATED, EXTENDED RELEASE ORAL
Refills: 0 | Status: ACTIVE | COMMUNITY
Start: 2014-08-12

## 2025-01-30 RX ORDER — LATANOPROST 0.005 %
INSTILL 1 DROP IN BOTH EYES AT BEDTIME DROPS OPHTHALMIC (EYE)
Refills: 0 | Status: ACTIVE | COMMUNITY
Start: 2014-08-12

## 2025-01-30 RX ORDER — SODIUM CHLORIDE 50 MG/ML
SOLUTION/ DROPS OPHTHALMIC
Qty: 45 | Refills: 0 | Status: ACTIVE | COMMUNITY
Start: 2019-06-03

## 2025-03-10 ENCOUNTER — OFFICE VISIT (OUTPATIENT)
Dept: URBAN - METROPOLITAN AREA CLINIC 113 | Facility: CLINIC | Age: 75
End: 2025-03-10

## 2025-03-26 ENCOUNTER — LAB OUTSIDE AN ENCOUNTER (OUTPATIENT)
Dept: URBAN - METROPOLITAN AREA CLINIC 113 | Facility: CLINIC | Age: 75
End: 2025-03-26

## 2025-03-26 ENCOUNTER — OFFICE VISIT (OUTPATIENT)
Dept: URBAN - METROPOLITAN AREA CLINIC 113 | Facility: CLINIC | Age: 75
End: 2025-03-26
Payer: MEDICARE

## 2025-03-26 DIAGNOSIS — Z86.0101 HISTORY OF ADENOMATOUS POLYP OF COLON: ICD-10-CM

## 2025-03-26 DIAGNOSIS — K21.9 GERD WITHOUT ESOPHAGITIS: ICD-10-CM

## 2025-03-26 PROCEDURE — 99213 OFFICE O/P EST LOW 20 MIN: CPT | Performed by: NURSE PRACTITIONER

## 2025-03-26 RX ORDER — MONTELUKAST 10 MG/1
1 TABLET TABLET, FILM COATED ORAL ONCE A DAY
Status: ACTIVE | COMMUNITY

## 2025-03-26 RX ORDER — KRILL/OM-3/DHA/EPA/PHOSPHO/AST 1000-230MG
TAKE 1 CAPSULE DAILY CAPSULE ORAL
Refills: 0 | Status: ON HOLD | COMMUNITY

## 2025-03-26 RX ORDER — PANTOPRAZOLE SODIUM 20 MG
TAKE 1 TABLET DAILY TABLET, DELAYED RELEASE (ENTERIC COATED) ORAL
Qty: 5 | Refills: 2 | Status: ON HOLD | COMMUNITY
Start: 2019-07-03

## 2025-03-26 RX ORDER — ASPIRIN 81 MG/1
1 TABLET TABLET, COATED ORAL ONCE A DAY
Status: ACTIVE | COMMUNITY

## 2025-03-26 RX ORDER — LOSARTAN POTASSIUM AND HYDROCHLOROTHIAZIDE 50; 12.5 MG/1; MG/1
1 TABLET TABLET, FILM COATED ORAL ONCE A DAY
Refills: 0 | Status: ON HOLD | COMMUNITY
Start: 2018-10-04

## 2025-03-26 RX ORDER — POTASSIUM CHLORIDE 750 MG/1
1 TABLET WITH FOOD TABLET, EXTENDED RELEASE ORAL TWICE A DAY
Refills: 0 | Status: ACTIVE | COMMUNITY
Start: 2014-08-12

## 2025-03-26 RX ORDER — BRIMONIDINE TARTRATE 2 MG/ML
INSTILL 1 DROP IN BOTH EYES EVERY 12 HOURS DAILY SOLUTION/ DROPS OPHTHALMIC
Refills: 0 | Status: ACTIVE | COMMUNITY
Start: 2014-08-12

## 2025-03-26 RX ORDER — SODIUM CHLORIDE 50 MG/ML
SOLUTION/ DROPS OPHTHALMIC
Qty: 45 | Refills: 0 | Status: ACTIVE | COMMUNITY
Start: 2019-06-03

## 2025-03-26 RX ORDER — VALSARTAN AND HYDROCHLOROTHIAZIDE 80; 12.5 MG/1; MG/1
1 TABLET TABLET, FILM COATED ORAL ONCE A DAY
Status: ON HOLD | COMMUNITY

## 2025-03-26 RX ORDER — METHYLCELLULOSE 500 MG/1
2 TABLETS WITH A FULL GLASS OF WATER AS NEEDED TABLET ORAL
Status: ON HOLD | COMMUNITY

## 2025-03-26 RX ORDER — ANTIOX #8/OM3/DHA/EPA/LUT/ZEAX 250-2.5 MG
AS DIRECTED CAPSULE ORAL
Status: ACTIVE | COMMUNITY

## 2025-03-26 RX ORDER — INSULIN GLARGINE 100 [IU]/ML
INJECT 12 UNITS INJECTION, SOLUTION SUBCUTANEOUS ONCE DAILY
Refills: 0 | Status: ACTIVE | COMMUNITY
Start: 2018-11-26

## 2025-03-26 RX ORDER — PREGABALIN 75 MG/1
1 CAPSULE CAPSULE ORAL ONCE A DAY
Status: ACTIVE | COMMUNITY

## 2025-03-26 RX ORDER — METOPROLOL TARTRATE 25 MG/1
TAKE 1 TABLET DAILY TABLET, FILM COATED ORAL
Refills: 0 | Status: ACTIVE | COMMUNITY
Start: 2014-08-12

## 2025-03-26 RX ORDER — POTASSIUM CHLORIDE 750 MG/1
TABLET, EXTENDED RELEASE ORAL
Qty: 180 | Refills: 0 | Status: ON HOLD | COMMUNITY
Start: 2018-10-29

## 2025-03-26 RX ORDER — SITAGLIPTIN AND METFORMIN HYDROCHLORIDE 100; 1000 MG/1; MG/1
TAKE 1 TABLET DAILY TABLET, FILM COATED, EXTENDED RELEASE ORAL
Refills: 0 | Status: ACTIVE | COMMUNITY
Start: 2014-08-12

## 2025-03-26 RX ORDER — DICLOFENAC SODIUM TOPICAL GEL, 1% 10 MG/G
GEL TOPICAL
Qty: 400 | Refills: 0 | Status: ON HOLD | COMMUNITY
Start: 2019-08-19

## 2025-03-26 RX ORDER — PANTOPRAZOLE SODIUM 40 MG/1
1 TABLET TABLET, DELAYED RELEASE ORAL ONCE A DAY
Refills: 0 | Status: ACTIVE | COMMUNITY
Start: 2018-11-15

## 2025-03-26 RX ORDER — FLUTICASONE PROPIONATE AND SALMETEROL 50; 100 UG/1; UG/1
INHALE 1 PUFF TWICE DAILY POWDER RESPIRATORY (INHALATION)
Refills: 0 | Status: ACTIVE | COMMUNITY
Start: 2014-08-12

## 2025-03-26 RX ORDER — COD LIVER OIL
AS DIRECTED CAPSULE ORAL
Status: ON HOLD | COMMUNITY

## 2025-03-26 RX ORDER — GLUCOSA SU 2KCL/CHONDROITIN SU 500-400 MG
AS DIRECTED CAPSULE ORAL
Status: ON HOLD | COMMUNITY

## 2025-03-26 RX ORDER — INSULIN GLARGINE 100 [IU]/ML
AS DIRECTED INJECTION, SOLUTION SUBCUTANEOUS AT BEDTIME
Refills: 0 | Status: ON HOLD | COMMUNITY
Start: 2014-08-12

## 2025-03-26 RX ORDER — PSYLLIUM HUSK 0.4 G
TAKE 2 TABLET DAILY CAPSULE ORAL
Refills: 0 | Status: ACTIVE | COMMUNITY

## 2025-03-26 RX ORDER — ROSUVASTATIN CALCIUM 10 MG/1
1 TABLET TABLET, FILM COATED ORAL ONCE A DAY
Status: ACTIVE | COMMUNITY

## 2025-03-26 RX ORDER — LATANOPROST 0.005 %
INSTILL 1 DROP IN BOTH EYES AT BEDTIME DROPS OPHTHALMIC (EYE)
Refills: 0 | Status: ACTIVE | COMMUNITY
Start: 2014-08-12

## 2025-03-26 RX ORDER — EZETIMIBE AND SIMVASTATIN 10; 40 MG/1; MG/1
TAKE 1 TABLET DAILY PRN TABLET ORAL
Refills: 0 | Status: ON HOLD | COMMUNITY
Start: 2014-08-12

## 2025-03-26 RX ORDER — DORZOLAMIDE HCL/PF 2 %
INSTILL 1 DROP INTO BOTH EYES DAILY DROPS OPHTHALMIC (EYE)
Refills: 0 | Status: ACTIVE | COMMUNITY

## 2025-03-26 NOTE — HPI-TODAY'S VISIT:
This is a 74-year-old female with a history of hypertension, diabetes, hyperlipidemia, sleep apnea, GERD, constipation, adenomatous colon polyps due surveillance, fatty liver, and liver enzyme elevation presenting for follow-up. She was last seen 1/9/2025.  Labs in September 2024 notable for normal liver enzymes.  GERD was controlled with pantoprazole 40 mg daily.  She was due colonoscopy for adenoma surveillance.  This was scheduled on 2 occasions and canceled. Office note from Dr. Reilly in January 2025 for COPD and dyspnea on exertion notable for stable COPD and normal prior pulmonary function testing. She is doing well.  Acid reflux is controlled with pantoprazole 40 mg daily.  She denies any other abdominal symptoms. She reports her colonoscopy was canceled due to second-degree heart block noted on cardiac monitor.  She has seen Dr. Marcus who has performed an echocardiogram and plans a 7-day monitor.  The latter has not been performed.  He has cleared her for carpal tunnel surgery which she was had.  He is also cleared her for colonoscopy per her report.  Parents has not been received.